# Patient Record
Sex: FEMALE | Race: WHITE | Employment: OTHER | URBAN - NONMETROPOLITAN AREA
[De-identification: names, ages, dates, MRNs, and addresses within clinical notes are randomized per-mention and may not be internally consistent; named-entity substitution may affect disease eponyms.]

---

## 2018-08-01 ENCOUNTER — OFFICE VISIT (OUTPATIENT)
Dept: PAIN MANAGEMENT | Age: 78
End: 2018-08-01
Payer: MEDICARE

## 2018-08-01 VITALS
BODY MASS INDEX: 33.6 KG/M2 | SYSTOLIC BLOOD PRESSURE: 150 MMHG | HEIGHT: 62 IN | WEIGHT: 182.6 LBS | DIASTOLIC BLOOD PRESSURE: 64 MMHG

## 2018-08-01 DIAGNOSIS — M47.817 LUMBOSACRAL SPONDYLOSIS WITHOUT MYELOPATHY: ICD-10-CM

## 2018-08-01 DIAGNOSIS — M48.061 SPINAL STENOSIS OF LUMBAR REGION WITHOUT NEUROGENIC CLAUDICATION: ICD-10-CM

## 2018-08-01 DIAGNOSIS — M54.16 LUMBAR RADICULOPATHY: Primary | ICD-10-CM

## 2018-08-01 DIAGNOSIS — M48.061 NEURAL FORAMINAL STENOSIS OF LUMBAR SPINE: ICD-10-CM

## 2018-08-01 PROCEDURE — 4040F PNEUMOC VAC/ADMIN/RCVD: CPT | Performed by: NURSE PRACTITIONER

## 2018-08-01 PROCEDURE — 99204 OFFICE O/P NEW MOD 45 MIN: CPT | Performed by: NURSE PRACTITIONER

## 2018-08-01 PROCEDURE — 4004F PT TOBACCO SCREEN RCVD TLK: CPT | Performed by: NURSE PRACTITIONER

## 2018-08-01 PROCEDURE — 1090F PRES/ABSN URINE INCON ASSESS: CPT | Performed by: NURSE PRACTITIONER

## 2018-08-01 PROCEDURE — 1123F ACP DISCUSS/DSCN MKR DOCD: CPT | Performed by: NURSE PRACTITIONER

## 2018-08-01 PROCEDURE — G8400 PT W/DXA NO RESULTS DOC: HCPCS | Performed by: NURSE PRACTITIONER

## 2018-08-01 PROCEDURE — G8427 DOCREV CUR MEDS BY ELIG CLIN: HCPCS | Performed by: NURSE PRACTITIONER

## 2018-08-01 PROCEDURE — 1101F PT FALLS ASSESS-DOCD LE1/YR: CPT | Performed by: NURSE PRACTITIONER

## 2018-08-01 PROCEDURE — G8417 CALC BMI ABV UP PARAM F/U: HCPCS | Performed by: NURSE PRACTITIONER

## 2018-08-01 RX ORDER — CHLORTHALIDONE 25 MG/1
12.5 TABLET ORAL DAILY
COMMUNITY
Start: 2018-06-06

## 2018-08-01 RX ORDER — CELECOXIB 200 MG/1
200 CAPSULE ORAL DAILY
COMMUNITY
Start: 2018-06-26

## 2018-08-01 RX ORDER — CYCLOSPORINE 0.5 MG/ML
2 EMULSION OPHTHALMIC DAILY
COMMUNITY
Start: 2018-07-03

## 2018-08-01 RX ORDER — ATORVASTATIN CALCIUM 10 MG/1
10 TABLET, FILM COATED ORAL DAILY
COMMUNITY
Start: 2018-07-05

## 2018-08-01 RX ORDER — PYRIDOXINE HCL (VITAMIN B6) 100 MG
2 TABLET ORAL DAILY
COMMUNITY

## 2018-08-01 RX ORDER — AMOXICILLIN 500 MG
1200 CAPSULE ORAL DAILY
COMMUNITY

## 2018-08-01 RX ORDER — OMEPRAZOLE 20 MG/1
20 CAPSULE, DELAYED RELEASE ORAL DAILY
COMMUNITY
Start: 2018-06-06

## 2018-08-01 ASSESSMENT — ENCOUNTER SYMPTOMS: BACK PAIN: 1

## 2018-08-01 NOTE — PATIENT INSTRUCTIONS
[place] [id number]        PROCEDURE INSTRUCTIONS FOR  PAIN MANAGEMENT PROCEDURES WITH ANESTHESIA/ IV SEDATION    Keegan Metcalf is scheduled to see Dr. Breonna Ambrosio to undergo the following procedure:   Right L3 and L4 Transforaminal Epidural Steroid Injection    Procedure Date: ____8/29/18____  Arrival Time: _____[place] will call to set time_____     Procedure Date: ____9/12/18____  Arrival Time: _____[place] will call to set time_____     Report to the [place] [id number], Registration office on the 1st floor in the hospital, after check in and signing of paper work you will then go to the second floor to the surgery center. 1. Stop the following medications prior to the procedure:   NONE    2. Take all routine medications unless otherwise instructed. Ok to take vitamins and antiinflammatory medications    3. EATING & DRINKING:  NO FOOD OR LIQUIDS FOR 8 HOURS PRIOR TO THE PROCEDURE    4. If you are allergic to contrast or iodine, you must take benadryl and prednisone prior to the injection to prevent an allergic reaction. Follow the directions on the prescription for the times to take the medication. 5.  Wear simple loose clothing, which can be easily changed. 6.  Leave jewelry (including rings) and other valuables at home. 7.  Make arrangements for a family member or friend to drive you to the surgery center. Your ride must stay in the hospital while you are having the injection done. If they cannot stay, the injection will be rescheduled. The sedation may affect your judgment following the procedure and driving a vehicle within 24 hours after the sedation could be dangerous. 8.  You will be asked to sign several forms prior to surgery; patients under the age of 25 must have a parent or legal guardian sign the permit to be able to do the procedure. 9.  You must have finished any antibiotic prescribed for recent infections.  If required, please take pre-procedure antibiotic or other pre-procedure medications as instructed. 10. Bring inhalers and pain medications with you to your procedure. 11. Bring your MRI/CT films if they were done outside of the [facility]. 12. If you should develop a cold, sore throat, cough, fever or other new indication of illness or infection, or are started on antibiotics within 2 weeks of the scheduled procedure, please notify the [facility] office as early as possible at [phone].

## 2018-08-29 ENCOUNTER — PROCEDURE VISIT (OUTPATIENT)
Dept: PAIN MANAGEMENT | Age: 78
End: 2018-08-29
Payer: MEDICARE

## 2018-08-29 DIAGNOSIS — M54.16 LUMBAR RADICULOPATHY: ICD-10-CM

## 2018-08-29 DIAGNOSIS — M47.816 LUMBAR SPONDYLOSIS: ICD-10-CM

## 2018-08-29 PROCEDURE — 64484 NJX AA&/STRD TFRM EPI L/S EA: CPT | Performed by: PHYSICAL MEDICINE & REHABILITATION

## 2018-08-29 PROCEDURE — 64483 NJX AA&/STRD TFRM EPI L/S 1: CPT | Performed by: PHYSICAL MEDICINE & REHABILITATION

## 2018-08-29 NOTE — PROGRESS NOTES
TRANSFORAMINAL EPIDURAL STEROID INJECTION    8/29/18  J  Surgeon: Pete Andersen MD    Pre-operative Diagnosis:   Patient Active Problem List   Diagnosis Code    Impingement syndrome of right shoulder region M75.41    Shoulder arthritis M19.019    Degenerative disc disease, lumbar M51.36    Lumbar radiculopathy M54.16    S/P hip replacement-right Z96.649    Lumbar spondylosis M47.816    Spinal stenosis of lumbar region without neurogenic claudication M48.061    Total knee replacement status Z96.659    Neural foraminal stenosis of lumbar spine M99.83    Lumbosacral spondylosis without myelopathy M47.817       Post-operative Diagnosis: Same    Assistants: none    This is a 68 y.o. female patient with pain in the Back, right leg.  Previous treatment and examination findings are noted in the H&P.RL3,4 transforaminal epidural injection has been requested for diagnostic and therapeutic reasons. Conservative treatment was ineffective i.e.: ice, NSAIDS, rest, narcotic medication, chiropractic care, physical therapy and message therapy. Patient is unable to perform the following ADL's: ambulating and grooming                         Last Plain films: 2017      EXAMINATION:  1. RL3,4 transforaminal radiculogram/epidurogram.   2. RL3,4 transforaminal epidural anesthetic injection. 3. RL3,4 transforaminal epidural steroid injection. CONSENT: Written consent was obtained from the patient on preprinted consent form after explaining the procedure, indications, potential complications and outcomes. Alternative treatments were also discussed. DISCUSSION: The patient was sterilely prepped and draped in the usual fashion in the prone position. Time out was verified for correct patient, side, level and procedure.     SEDATION:   No conscious sedation was performed during the procedure.  The patient remained awake and conversed throughout the procedure.  The patient underwent pulse oximetry and blood pressure monitoring independently by a trained observer, as well as by a physician. PROCEDURE:  Under image-intensifier control, a 22 gauge needle x 5 inch spinal needle was guided successfully into the epidural space employing a posterior lateral/oblique approach. Needle aspiration was negative for heme or CSF. Instillation of  .5 mL of Omnipaque 240 contrast medium opacified the spinal nerve and demonstrated contiguous flow into the  RL 4 epidural space. No vascular spread was noted. Digital subtraction was not employed to evaluate for vascular spread. The patient was monitored for any untoward reaction to contrast medium before proceeding with procedure #2. The patient did not report pain reproduction in a concordant distribution. Following needle position verification, a test dose of .5 mL of sterile saline was administered and patient monitored for any adverse effects. Then, 1 mL of Dexamethasone (Decadron 10 mg/mL)   was instilled into the epidural space and the patient's response was again monitored. Finally,  1 ml of 0.5% bupivacaine was then instilled. The patient's response was again monitored. The spinal needle was removed. Instillation of  .5 mL of Omnipaque 240 contrast medium opacified the spinal nerve and demonstrated contiguous flow into the RL 3epidural space. No vascular spread was noted. Digital subtraction was not employed to evaluate for vascular spread. The patient was monitored for any untoward reaction to contrast medium before proceeding with procedure #2. The patient did not report pain reproduction in a concordant distribution. Following needle position verification, a test dose of .5 mL of sterile saline was administered and patient monitored for any adverse effects. Then, 1 mL of Dexamethasone (Decadron 10 mg/mL)   was instilled into the epidural space and the patient's response was again monitored. Finally,  1 ml of 0.5% bupivacaine was then instilled.  The patient's

## 2018-09-12 ENCOUNTER — PROCEDURE VISIT (OUTPATIENT)
Dept: PAIN MANAGEMENT | Age: 78
End: 2018-09-12
Payer: MEDICARE

## 2018-09-12 DIAGNOSIS — M51.36 DEGENERATIVE DISC DISEASE, LUMBAR: ICD-10-CM

## 2018-09-12 DIAGNOSIS — M75.41 IMPINGEMENT SYNDROME OF RIGHT SHOULDER REGION: ICD-10-CM

## 2018-09-12 PROCEDURE — 64484 NJX AA&/STRD TFRM EPI L/S EA: CPT | Performed by: PHYSICAL MEDICINE & REHABILITATION

## 2018-09-12 PROCEDURE — 64483 NJX AA&/STRD TFRM EPI L/S 1: CPT | Performed by: PHYSICAL MEDICINE & REHABILITATION

## 2018-09-12 NOTE — PROGRESS NOTES
TRANSFORAMINAL EPIDURAL STEROID INJECTION    9/12/18  mack  Surgeon: Arturo Jones MD    Pre-operative Diagnosis:   Patient Active Problem List   Diagnosis Code    Impingement syndrome of right shoulder region M75.41    Shoulder arthritis M19.019    Degenerative disc disease, lumbar M51.36    Lumbar radiculopathy M54.16    S/P hip replacement-right Z96.649    Lumbar spondylosis M47.816    Spinal stenosis of lumbar region without neurogenic claudication M48.061    Total knee replacement status Z96.659    Neural foraminal stenosis of lumbar spine M99.83    Lumbosacral spondylosis without myelopathy M47.817       Post-operative Diagnosis: Same    Assistants: none    This is a 68 y.o. female patient with pain in the Back, left leg.  Previous treatment and examination findings are noted in the H&P. LL3,4 transforaminal epidural injection has been requested for diagnostic and therapeutic reasons. Conservative treatment was ineffective i.e.: ice, NSAIDS, rest, narcotic medication, chiropractic care, physical therapy and message therapy. Patient is unable to perform the following ADL's: ambulating, grooming and hygiene                         Last Plain films: 2017      EXAMINATION:  1. LL3,4 transforaminal radiculogram/epidurogram.   2. LL3,4 transforaminal epidural anesthetic injection. 3. LL3,4 transforaminal epidural steroid injection. CONSENT: Written consent was obtained from the patient on preprinted consent form after explaining the procedure, indications, potential complications and outcomes. Alternative treatments were also discussed. DISCUSSION: The patient was sterilely prepped and draped in the usual fashion in the prone position. Time out was verified for correct patient, side, level and procedure.     SEDATION:   No conscious sedation was performed during the procedure.  The patient remained awake and conversed throughout the procedure.  The patient underwent pulse oximetry and blood pressure monitoring independently by a trained observer, as well as by a physician. PROCEDURE:  Under image-intensifier control, a 22 gauge needle x 5 inch spinal needle was guided successfully into the epidural space employing a posterior lateral/oblique approach. Needle aspiration was negative for heme or CSF. Instillation of  .5 mL of Omnipaque 240 contrast medium opacified the spinal nerve and demonstrated contiguous flow into the  RL 4epidural space. No vascular spread was noted. Digital subtraction was not employed to evaluate for vascular spread. The patient was monitored for any untoward reaction to contrast medium before proceeding with procedure #2. The patient did not report pain reproduction in a concordant distribution. Following needle position verification, a test dose of .5 mL of sterile saline was administered and patient monitored for any adverse effects. Then, 1 mL of Dexamethasone (Decadron 10 mg/mL)   was instilled into the epidural space and the patient's response was again monitored. Finally,  1 ml of 0.5% bupivacaine was then instilled. The patient's response was again monitored. The spinal needle was removed. Instillation of  .5 mL of Omnipaque 240 contrast medium opacified the spinal nerve and demonstrated contiguous flow into the RL 3  epidural space. No vascular spread was noted. Digital subtraction was not employed to evaluate for vascular spread. The patient was monitored for any untoward reaction to contrast medium before proceeding with procedure #2. The patient did not report pain reproduction in a concordant distribution. Following needle position verification, a test dose of .5 mL of sterile saline was administered and patient monitored for any adverse effects. Then, 1 mL of Dexamethasone (Decadron 10 mg/mL)   was instilled into the epidural space and the patient's response was again monitored. Finally,  1 ml of 0.5% bupivacaine was then instilled.  The

## 2018-09-26 ENCOUNTER — OFFICE VISIT (OUTPATIENT)
Dept: PAIN MANAGEMENT | Age: 78
End: 2018-09-26
Payer: MEDICARE

## 2018-09-26 VITALS
BODY MASS INDEX: 33.49 KG/M2 | WEIGHT: 182 LBS | HEIGHT: 62 IN | SYSTOLIC BLOOD PRESSURE: 170 MMHG | RESPIRATION RATE: 16 BRPM | DIASTOLIC BLOOD PRESSURE: 60 MMHG

## 2018-09-26 DIAGNOSIS — M51.36 DEGENERATIVE DISC DISEASE, LUMBAR: Primary | ICD-10-CM

## 2018-09-26 DIAGNOSIS — M48.061 NEURAL FORAMINAL STENOSIS OF LUMBAR SPINE: ICD-10-CM

## 2018-09-26 DIAGNOSIS — M48.061 SPINAL STENOSIS OF LUMBAR REGION WITHOUT NEUROGENIC CLAUDICATION: ICD-10-CM

## 2018-09-26 DIAGNOSIS — M54.16 LUMBAR RADICULOPATHY: ICD-10-CM

## 2018-09-26 PROCEDURE — 1123F ACP DISCUSS/DSCN MKR DOCD: CPT | Performed by: PHYSICAL MEDICINE & REHABILITATION

## 2018-09-26 PROCEDURE — 1090F PRES/ABSN URINE INCON ASSESS: CPT | Performed by: PHYSICAL MEDICINE & REHABILITATION

## 2018-09-26 PROCEDURE — 4004F PT TOBACCO SCREEN RCVD TLK: CPT | Performed by: PHYSICAL MEDICINE & REHABILITATION

## 2018-09-26 PROCEDURE — G8400 PT W/DXA NO RESULTS DOC: HCPCS | Performed by: PHYSICAL MEDICINE & REHABILITATION

## 2018-09-26 PROCEDURE — 1101F PT FALLS ASSESS-DOCD LE1/YR: CPT | Performed by: PHYSICAL MEDICINE & REHABILITATION

## 2018-09-26 PROCEDURE — G8417 CALC BMI ABV UP PARAM F/U: HCPCS | Performed by: PHYSICAL MEDICINE & REHABILITATION

## 2018-09-26 PROCEDURE — 4040F PNEUMOC VAC/ADMIN/RCVD: CPT | Performed by: PHYSICAL MEDICINE & REHABILITATION

## 2018-09-26 PROCEDURE — 99214 OFFICE O/P EST MOD 30 MIN: CPT | Performed by: PHYSICAL MEDICINE & REHABILITATION

## 2018-09-26 PROCEDURE — G8428 CUR MEDS NOT DOCUMENT: HCPCS | Performed by: PHYSICAL MEDICINE & REHABILITATION

## 2018-09-26 ASSESSMENT — ENCOUNTER SYMPTOMS
ALLERGIC/IMMUNOLOGIC NEGATIVE: 1
EYES NEGATIVE: 1
RESPIRATORY NEGATIVE: 1

## 2018-09-26 NOTE — PROGRESS NOTES
PAIN MANAGEMENT FOLLOW-UP NOTE  9/26/18    CHIEF COMPLAINT: This is a 68 y.o. female patient who returns to the [facility] Management Clinic with a history of Follow Up After Procedure (didn't help much, states that it helped maybe a day or two); Back Pain; Leg Pain (right side); and Other (had therapy last October)      PAIN HPI: Garfield Peters returns today for  reevaluation. Since the visit, the patient reports that the pain is not changed. Perhaps 2 days of back and leg pain improvement with last TFEs  Also notes R lateral leg pain, L gluteal pain and  L  Toes pain  recentlyANALGESIA:   Are your Current Pain medication (s) helping to decrease pain? Yes. Current Pain score: Pain Score:   7    ADVERSE AFFECTS:   Medication Side Effects: No.    ACTIVITY:  Are you able to be more active with your pain medications? No      ABERRANT BEHAVIORS SINCE LAST VISIT? No    Review of Systems   Constitutional: Negative. HENT: Negative. Eyes: Negative. Respiratory: Negative. Endocrine: Negative. Genitourinary: Negative. Allergic/Immunologic: Negative. Neurological: Negative.          Allergies   Allergen Reactions    Bactrim [Sulfamethoxazole-Trimethoprim] Itching     Puffy eyes       Outpatient Medications Prior to Visit   Medication Sig Dispense Refill    atorvastatin (LIPITOR) 10 MG tablet Take 10 mg by mouth daily      celecoxib (CELEBREX) 200 MG capsule Take 200 mg by mouth daily      omeprazole (PRILOSEC) 20 MG delayed release capsule Take 20 mg by mouth daily      RESTASIS 0.05 % ophthalmic emulsion Place 2 drops into both eyes daily      chlorthalidone (HYGROTON) 25 MG tablet Take 12.5 mg by mouth daily      Omega-3 Fatty Acids (FISH OIL) 1200 MG CAPS Take 1,200 mg by mouth daily      Calcium Carb-Cholecalciferol (CALCIUM 600 + D) 600-200 MG-UNIT TABS Take 2 tablets by mouth daily      lisinopril (PRINIVIL;ZESTRIL) 20 MG tablet Take 20 mg by mouth daily       metoprolol (TOPROL-XL) 25 MG XL tablet Take 25 mg by mouth daily       DEXILANT 60 MG CPDR capsule Take 60 mg by mouth daily        No facility-administered medications prior to visit.         Past Medical History:   Diagnosis Date    Aftercare following joint replacement     Arthritis of knee, right     Carpal tunnel syndrome, left     Degenerative cervical disc     Degenerative disc disease, lumbar     Disorder of bursae and tendons in shoulder region     Diverticulitis of colon     Gastroesophageal reflux disease     Gastroesophageal reflux disease     Hx of bladder problems     Hypercholesteremia     Hypertension     Incmpl rotatr-cuff tear/ruptr of unsp shoulder, not trauma     Joint disease     Left hand paresthesia     Mixed hyperlipidemia     Muscle disease     Presence of artificial hip joint     Presence of right artificial hip joint     Screening mammogram, encounter for     Trigger finger, left middle finger     Trigger finger, right middle finger     Unspecified sleep apnea        Past Surgical History:   Procedure Laterality Date    BLADDER SUSPENSION  2007    CARPAL TUNNEL RELEASE  2004    CATARACT REMOVAL      JOINT REPLACEMENT  2009    left total knee  2009    JOINT REPLACEMENT  2013    right total hip replacement    KNEE ARTHROSCOPY  2005    TOTAL KNEE ARTHROPLASTY  2009     Family History   Problem Relation Age of Onset    Heart Disease Father     Hypertension Father     Cancer Father     Diabetes Father     Hypertension Mother     Cancer Mother      Social History     Social History    Marital status:      Spouse name: N/A    Number of children: N/A    Years of education: N/A     Social History Main Topics    Smoking status: Never Smoker    Smokeless tobacco: Not on file    Alcohol use Not on file    Drug use: Unknown    Sexual activity: Not on file     Other Topics Concern    Not on file     Social History Narrative    No narrative on file         Family and Social History reviewed in the electronic medical record. Imaging: Reviewed available imaging in our system with the patient. No results found. Objective:     Physical Exam:  Vitals:    09/26/18 1115   BP: (!) 200/62   Site: Left Upper Arm   Position: Sitting   Cuff Size: Large Adult   Resp: 16   Weight: 182 lb (82.6 kg)   Height: 5' 2\" (1.575 m)     Pain Score:   7    Physical Exam   Constitutional: She appears well-developed and well-nourished. Back Exam     Tenderness   The patient is experiencing tenderness in the lumbar. Range of Motion   Flexion:                  Extension:                 Lateral Bend Left:    Normal  Lateral Bend Right:  Abnormal  Rotation Right:          Rotation Left:           60  SLR    Right: Positive  Left:    Negative    Comments:  +Kemps B   +slump B                                    Assessment: This is a 68 y.o. female patient with:    Diagnosis:    Diagnosis Orders   1. Degenerative disc disease, lumbar     2. Lumbar radiculopathy     3. Spinal stenosis of lumbar region without neurogenic claudication     4. Neural foraminal stenosis of lumbar spine         Medical Comorbidities:  As listed in the patient's past medical and surgical history    Functional Limitations:   Pain limits function and quality of life. Plan:   L4 IESi x2   Consider R femoral injection    Meds:   Controlled Substances Monitoring: Pt educated about the risks of taking opiates, including increased sedation, constipation, slowed breathing, tolerance, dependence, and addiction. New Prescriptions    No medications on file      No orders of the defined types were placed in this encounter. No orders of the defined types were placed in this encounter. No Follow-up on file. The patient expressed understanding of the above assessment and plan.     Total time spent face to face with patient was 25 minutes in which  50% or more of the time was spent in counseling, education about risk and benefits

## 2018-10-24 ENCOUNTER — PROCEDURE VISIT (OUTPATIENT)
Dept: PAIN MANAGEMENT | Age: 78
End: 2018-10-24
Payer: MEDICARE

## 2018-10-24 DIAGNOSIS — M54.16 LUMBAR RADICULOPATHY: ICD-10-CM

## 2018-10-24 DIAGNOSIS — M47.816 LUMBAR SPONDYLOSIS: ICD-10-CM

## 2018-10-24 PROCEDURE — 62323 NJX INTERLAMINAR LMBR/SAC: CPT | Performed by: PHYSICAL MEDICINE & REHABILITATION

## 2018-10-24 NOTE — PROGRESS NOTES
control, a 22 gauge needle x 5 inch spinal needle was guided successfully into the epidural space employing a posterior midline/paramedian interlaminar approach. Needle aspiration was negative for heme or CSF. Instillation of  .5 mL of Omnipaque 240 contrast medium opacified the spinal nerve and demonstrated contiguous flow into the epidural space. No vascular spread was noted. Digital subtraction was not employed to evaluate for vascular spread. The patient was monitored for any untoward reaction to contrast medium before proceeding. The patient did not report pain reproduction in a concordant distribution. Following needle position verification, a test dose of  .5 mL of sterile normal saline was administered and patient monitored for any adverse effects. Then, 1 mL of Dexamethasone (Decadron 10mg/mL) was instilled into the epidural space and the patient's response was again monitored. Finally, 1ml of 0.5% bupivacaine was then instilled. The patient's response was again monitored. The spinal needle was removed, and the patient's pain response, gait pattern, sensorimotor examination and range of motion were examined. The patient tolerated the procedure well and without complications and was noted to be in stable condition prior to discharge from the procedure center with discharge instructions. IMPRESSIONS:  1. L4-5 Interlaminar epidurogram, epidural anesthesia and epidural steroid injection procedures accomplished without incident. EBL: no blood loss    SPECIMEN: none    RECOMMENDATIONS:  1. Complete and return Post-Procedure Pain and Activity Diary.    2. Contact the [address] for symptom exacerbation, fever or unusual symptoms. 3. Post-procedure care according to verbal and written discharge instructions    POST-PROCEDURE EPIDUROGRAPHY INTERPRETATION:    EXAMINATION: AP, lateral views.     FLUORO TIME: 33 seconds    DISCUSSION: Spot views of the spine reveal normal alignment and

## 2024-04-30 ENCOUNTER — OFFICE VISIT (OUTPATIENT)
Dept: PAIN MANAGEMENT | Age: 84
End: 2024-04-30
Payer: MEDICARE

## 2024-04-30 VITALS
WEIGHT: 171.6 LBS | SYSTOLIC BLOOD PRESSURE: 138 MMHG | OXYGEN SATURATION: 98 % | BODY MASS INDEX: 31.58 KG/M2 | DIASTOLIC BLOOD PRESSURE: 72 MMHG | HEART RATE: 58 BPM | RESPIRATION RATE: 18 BRPM | HEIGHT: 62 IN

## 2024-04-30 DIAGNOSIS — M48.061 NEURAL FORAMINAL STENOSIS OF LUMBAR SPINE: ICD-10-CM

## 2024-04-30 DIAGNOSIS — Z96.651 STATUS POST TOTAL RIGHT KNEE REPLACEMENT: ICD-10-CM

## 2024-04-30 DIAGNOSIS — M54.16 LUMBAR RADICULOPATHY: Primary | ICD-10-CM

## 2024-04-30 DIAGNOSIS — M47.816 LUMBAR SPONDYLOSIS: ICD-10-CM

## 2024-04-30 DIAGNOSIS — N39.41 URGE INCONTINENCE OF URINE: ICD-10-CM

## 2024-04-30 DIAGNOSIS — M48.061 SPINAL STENOSIS OF LUMBAR REGION WITHOUT NEUROGENIC CLAUDICATION: ICD-10-CM

## 2024-04-30 PROCEDURE — 0509F URINE INCON PLAN DOCD: CPT | Performed by: PHYSICAL MEDICINE & REHABILITATION

## 2024-04-30 PROCEDURE — G8417 CALC BMI ABV UP PARAM F/U: HCPCS | Performed by: PHYSICAL MEDICINE & REHABILITATION

## 2024-04-30 PROCEDURE — 99203 OFFICE O/P NEW LOW 30 MIN: CPT | Performed by: PHYSICAL MEDICINE & REHABILITATION

## 2024-04-30 PROCEDURE — 1123F ACP DISCUSS/DSCN MKR DOCD: CPT | Performed by: PHYSICAL MEDICINE & REHABILITATION

## 2024-04-30 PROCEDURE — 99204 OFFICE O/P NEW MOD 45 MIN: CPT | Performed by: PHYSICAL MEDICINE & REHABILITATION

## 2024-04-30 PROCEDURE — G8400 PT W/DXA NO RESULTS DOC: HCPCS | Performed by: PHYSICAL MEDICINE & REHABILITATION

## 2024-04-30 PROCEDURE — 1036F TOBACCO NON-USER: CPT | Performed by: PHYSICAL MEDICINE & REHABILITATION

## 2024-04-30 PROCEDURE — 1090F PRES/ABSN URINE INCON ASSESS: CPT | Performed by: PHYSICAL MEDICINE & REHABILITATION

## 2024-04-30 PROCEDURE — G8427 DOCREV CUR MEDS BY ELIG CLIN: HCPCS | Performed by: PHYSICAL MEDICINE & REHABILITATION

## 2024-04-30 RX ORDER — MELOXICAM 15 MG/1
TABLET ORAL
COMMUNITY
Start: 2023-03-07

## 2024-04-30 RX ORDER — SERTRALINE HCL 50 MG
TABLET ORAL
COMMUNITY
Start: 2024-01-16

## 2024-04-30 RX ORDER — CARVEDILOL 12.5 MG/1
TABLET ORAL
COMMUNITY
Start: 2023-03-07

## 2024-04-30 RX ORDER — ALLOPURINOL 100 MG/1
TABLET ORAL
COMMUNITY
Start: 2023-03-07

## 2024-04-30 RX ORDER — CYCLOBENZAPRINE HCL 5 MG
TABLET ORAL
COMMUNITY
Start: 2024-02-12

## 2024-04-30 ASSESSMENT — ENCOUNTER SYMPTOMS
CONSTIPATION: 1
EYES NEGATIVE: 1
RESPIRATORY NEGATIVE: 1
BACK PAIN: 1
ALLERGIC/IMMUNOLOGIC NEGATIVE: 1
DIARRHEA: 1

## 2024-04-30 NOTE — PROGRESS NOTES
CONTRAST           I have reviewed the chief complaint and the history of present illness, vitals and all subjective documentation by [facility] clinical staff.    The patient's history and physical examination are consistent with lumbar radiculitis .     The patient has failed to see improvement with conservative measures.    The patient's pain is moderate to severe and causes functional deficit measured on pain and functional disability scales and reporting worsening quality of life.    PLAN:  Treatment options have been discussed with patient and all questions have been answered to patients satisfaction. The risks, benefits, and any alternatives of treatment have been discussed.    Medications:  Non-opioid therapy, the following medications are prescribed:-    Opioid therapy, the following medication are prescribed: -  -Pain Treatment agreement to be reviewed and signed by patient -  -Urine Drug Screen to be completed -  Education provided to patient regarding short term and long term implications of opioid medication use and safe storage.    Interventions:  Schedule - with flouro guidance and [] with [] without sedation. No      Imaging:  Schedule -    Referrals:  Place referral to -    Follow-up Plan:  Schedule patient to follow up with our office in -       Immanuel Johnson MD  Interventional Pain Management/PM&R   [facility]

## 2024-05-03 ENCOUNTER — TELEPHONE (OUTPATIENT)
Dept: PAIN MANAGEMENT | Age: 84
End: 2024-05-03

## 2024-05-03 NOTE — TELEPHONE ENCOUNTER
Patient is calling wanting pain medication.    Last Appt:  4/30/2024  Next Appt:   Visit date not found  Med verified in Epic      Patient has MRI scheduled in 2 weeks.    [facility]

## 2024-05-21 DIAGNOSIS — M47.816 LUMBAR SPONDYLOSIS: ICD-10-CM

## 2024-05-21 DIAGNOSIS — M48.061 SPINAL STENOSIS OF LUMBAR REGION WITHOUT NEUROGENIC CLAUDICATION: ICD-10-CM

## 2024-05-21 DIAGNOSIS — M48.061 NEURAL FORAMINAL STENOSIS OF LUMBAR SPINE: ICD-10-CM

## 2024-05-21 DIAGNOSIS — M54.16 LUMBAR RADICULOPATHY: ICD-10-CM

## 2024-05-21 DIAGNOSIS — N39.41 URGE INCONTINENCE OF URINE: ICD-10-CM

## 2024-05-21 DIAGNOSIS — Z96.651 STATUS POST TOTAL RIGHT KNEE REPLACEMENT: ICD-10-CM

## 2024-06-05 ENCOUNTER — OFFICE VISIT (OUTPATIENT)
Dept: PAIN MANAGEMENT | Age: 84
End: 2024-06-05
Payer: MEDICARE

## 2024-06-05 VITALS
RESPIRATION RATE: 17 BRPM | HEART RATE: 57 BPM | WEIGHT: 172 LBS | DIASTOLIC BLOOD PRESSURE: 70 MMHG | OXYGEN SATURATION: 93 % | HEIGHT: 62 IN | BODY MASS INDEX: 31.65 KG/M2 | SYSTOLIC BLOOD PRESSURE: 158 MMHG

## 2024-06-05 DIAGNOSIS — M48.061 SPINAL STENOSIS OF LUMBAR REGION WITHOUT NEUROGENIC CLAUDICATION: ICD-10-CM

## 2024-06-05 DIAGNOSIS — M47.816 LUMBAR SPONDYLOSIS: ICD-10-CM

## 2024-06-05 DIAGNOSIS — M51.36 DEGENERATIVE DISC DISEASE, LUMBAR: Primary | ICD-10-CM

## 2024-06-05 PROCEDURE — G8427 DOCREV CUR MEDS BY ELIG CLIN: HCPCS | Performed by: PHYSICAL MEDICINE & REHABILITATION

## 2024-06-05 PROCEDURE — G8400 PT W/DXA NO RESULTS DOC: HCPCS | Performed by: PHYSICAL MEDICINE & REHABILITATION

## 2024-06-05 PROCEDURE — 99214 OFFICE O/P EST MOD 30 MIN: CPT | Performed by: PHYSICAL MEDICINE & REHABILITATION

## 2024-06-05 PROCEDURE — 1036F TOBACCO NON-USER: CPT | Performed by: PHYSICAL MEDICINE & REHABILITATION

## 2024-06-05 PROCEDURE — G8417 CALC BMI ABV UP PARAM F/U: HCPCS | Performed by: PHYSICAL MEDICINE & REHABILITATION

## 2024-06-05 PROCEDURE — 1090F PRES/ABSN URINE INCON ASSESS: CPT | Performed by: PHYSICAL MEDICINE & REHABILITATION

## 2024-06-05 PROCEDURE — 1123F ACP DISCUSS/DSCN MKR DOCD: CPT | Performed by: PHYSICAL MEDICINE & REHABILITATION

## 2024-06-05 ASSESSMENT — ENCOUNTER SYMPTOMS
ALLERGIC/IMMUNOLOGIC NEGATIVE: 1
CONSTIPATION: 1
VOMITING: 0
SHORTNESS OF BREATH: 0
EYES NEGATIVE: 1
BACK PAIN: 1
NAUSEA: 1
ABDOMINAL PAIN: 1
DIARRHEA: 0

## 2024-06-05 NOTE — PATIENT INSTRUCTIONS
[place] [id number]    PROCEDURE INSTRUCTIONS FOR PAIN MANAGEMENT PROCEDURES    You are scheduled to see Dr. Johnson to undergo the following procedure:  Diagnostic Medial Branch Block Right Lumbar 3-4, 4-5, L5-S1    Procedure Date:  Wed 6/26/24    Arrival Time: You will receive a call from [facility] to inform you of your arrival time at least a couple days prior to your procedure.    Enter the facility through the ER doors and take the elevator to the 2nd floor.  Check in at Same Day Surgery.     1. Stop the following medications prior to the procedure:  N/A     2.  Take all routine medications unless otherwise instructed. Ok to take vitamins and antiinflammatory medications    3.  EATING & DRINKING:  NO FOOD OR LIQUIDS FOR 4 HOURS PRIOR TO THE PROCEDURE    4. If you are allergic to contrast or iodine, you must take benadryl and prednisone prior to the injection to prevent an allergic reaction.  Follow the directions on the prescription for the times to take the medication.    5.  Wear simple loose clothing, which can be easily changed.      6.  Leave jewelry (including rings) and other valuables at home.     7.  Make arrangements for a family member or friend to drive you to the surgery center.  Your ride must stay in the hospital while you are having the injection done.  The sedation may affect your judgment following the procedure and driving a vehicle within 24 hours after the sedation could be dangerous.     8.  You will be asked to sign several forms prior to surgery; patients under the age of 18 must have a parent or legal guardian sign the permit to be able to do the procedure.      9. Bring inhalers and pain medications with you to your procedure.      10. If you should develop a cold, sore throat, cough, fever or other new indication of illness or infection, or are started on antibiotics within 2 weeks of the scheduled procedure,

## 2024-06-05 NOTE — PROGRESS NOTES
Frequency of Pain Intermittent   Aggravating Factors Standing;Walking;Bending;Straightening;Stretching   Limiting Behavior Yes   Relieving Factors Nsaids;Rest;Ice   Result of Injury No   Work-Related Injury No   Are there other pain locations you wish to document? No        Current Medications & Allergies:   Current Outpatient Medications   Medication Instructions   • allopurinol (ZYLOPRIM) 100 MG tablet    • atorvastatin (LIPITOR) 10 mg, Oral, DAILY   • Calcium Carb-Cholecalciferol (CALCIUM 600 + D) 600-200 MG-UNIT TABS 2 tablets, Oral, DAILY   • carvedilol (COREG) 12.5 MG tablet    • chlorthalidone (HYGROTON) 12.5 mg, Oral, DAILY   • cyclobenzaprine (FLEXERIL) 5 MG tablet    • Fish Oil 1,200 mg, Oral, DAILY   • GLUCOS-CHONDROIT-CA-MG-C-D PO Oral   • lisinopril (PRINIVIL;ZESTRIL) 20 mg, Oral, 2 TIMES DAILY   • meloxicam (MOBIC) 15 MG tablet    • omeprazole (PRILOSEC) 20 mg, Oral, DAILY   • RESTASIS 0.05 % ophthalmic emulsion 2 drops, Both Eyes, DAILY   • ZOLOFT 50 MG tablet        Allergies   Allergen Reactions   • Bactrim [Sulfamethoxazole-Trimethoprim] Itching     Puffy eyes       Review of Systems:   Review of Systems   Constitutional:  Negative for activity change, appetite change and fatigue.   Eyes: Negative.    Respiratory:  Negative for shortness of breath.    Cardiovascular:  Negative for chest pain.   Gastrointestinal:  Positive for abdominal pain, constipation and nausea. Negative for diarrhea and vomiting.   Endocrine: Negative.    Genitourinary:  Positive for frequency. Negative for difficulty urinating.   Musculoskeletal:  Positive for back pain, gait problem and myalgias. Negative for neck pain and neck stiffness.   Skin: Negative.    Allergic/Immunologic: Negative.    Neurological:  Positive for weakness and headaches. Negative for dizziness and light-headedness.   Hematological: Negative.    Psychiatric/Behavioral:  Positive for agitation, confusion and decreased concentration. Negative for sleep

## 2024-06-25 ENCOUNTER — TELEPHONE (OUTPATIENT)
Dept: PAIN MANAGEMENT | Age: 84
End: 2024-06-25

## 2024-06-26 ENCOUNTER — PROCEDURE VISIT (OUTPATIENT)
Dept: PAIN MANAGEMENT | Age: 84
End: 2024-06-26
Payer: MEDICARE

## 2024-06-26 DIAGNOSIS — M47.817 LUMBOSACRAL SPONDYLOSIS WITHOUT MYELOPATHY: ICD-10-CM

## 2024-06-26 DIAGNOSIS — M48.061 NEURAL FORAMINAL STENOSIS OF LUMBAR SPINE: ICD-10-CM

## 2024-06-26 DIAGNOSIS — M47.816 LUMBAR SPONDYLOSIS: Primary | ICD-10-CM

## 2024-06-26 DIAGNOSIS — M51.36 DEGENERATIVE DISC DISEASE, LUMBAR: ICD-10-CM

## 2024-06-26 PROCEDURE — 64493 INJ PARAVERT F JNT L/S 1 LEV: CPT | Performed by: PHYSICAL MEDICINE & REHABILITATION

## 2024-06-26 PROCEDURE — 64494 INJ PARAVERT F JNT L/S 2 LEV: CPT | Performed by: PHYSICAL MEDICINE & REHABILITATION

## 2024-06-27 NOTE — TELEPHONE ENCOUNTER
Attempted to call pt.  She was not at home.  I was told she should be home in 20 minutes.  I said I would try again tomorrow if not this afternoon.

## 2024-06-28 NOTE — PROGRESS NOTES
MEDIAL BRANCH BLOCK   Diagnostic  lumbar    6/26/24    Surgeon: Immanuel Johnson MD    Pre-operative Diagnosis: lumbar facet syndrome ,  lumbar spondylosis    Post-operative Diagnosis: Same    INDICATION:Please see H&P for details on previous treatments, examination findings, and work up. bilateral B L4-55-S1 medial branch blocks are requested for diagnostic reasons.    Conservative treatment was ineffective i.e.: ice, NSAIDS, rest, narcotic medication, chiropractic care, physical therapy and message therapy.    Patient is unable to perform the following ADL's: ambulating and grooming                         Last Plain films: 2022    EXAMINATION:  bilateral B L4-5-5S1 medial branch blocks.    CONSENT:  Written consent was obtained from the patient on preprinted consent form after explaining the procedure, indications, potential complications and outcomes. Alternative treatments were also discussed.    DISCUSSION:  The patient was sterilely prepped and draped in the usual fashion in the prone position.  “Time out” was verified for correct patient, side, level and procedure.    SEDATION:   No conscious sedation was performed during the procedure.  The patient remained awake and conversed throughout the procedure.  The patient underwent pulse oximetry and blood pressure monitoring independently by a trained observer, as well as by a physician.    PROCEDURE:   Under image-intensifier control, 22 gauge needle x 5 inch spinal needles were directed into the bilateral B L4-55-S1 medial branches of the dorsal rami at the target points, according to SIS guidelines.  Needle tip positions were confirmed with 0.2 mL of Omnipaque 240 contrast medium. Then, 1mL of equal volumes of 0.75% bupivacaine // 2% lidocaine / and Celestone> were instilled at each site.     EBL: no blood loss    SPECIMEN: none    The patient tolerated the procedure well and without complications and was noted to be in stable condition prior to discharge from

## 2024-06-28 NOTE — TELEPHONE ENCOUNTER
I spoke to pt.  She said her pain was higher when she walked from the parking lot to  the surgery center at  for her injection.  She said her pain was an 8 and then went down to a 2 immediately afterward.  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    We discussed.  She said her leg had some weakness immediately after injection, saying she felt \"a nerve down her leg\" during the injection and that her BP went up.  By the time her BP was OK to leave the facility, her leg was stronger.    I scheduled her a follow up visit 7/10 in the office to discuss with Dr. Johnson what the next pain management step should be.  She states understanding and agreement.

## 2024-07-10 ENCOUNTER — OFFICE VISIT (OUTPATIENT)
Dept: PAIN MANAGEMENT | Age: 84
End: 2024-07-10
Payer: MEDICARE

## 2024-07-10 VITALS
OXYGEN SATURATION: 95 % | DIASTOLIC BLOOD PRESSURE: 78 MMHG | SYSTOLIC BLOOD PRESSURE: 194 MMHG | HEART RATE: 58 BPM | HEIGHT: 62 IN | WEIGHT: 171 LBS | BODY MASS INDEX: 31.47 KG/M2 | RESPIRATION RATE: 16 BRPM

## 2024-07-10 DIAGNOSIS — M47.816 LUMBAR SPONDYLOSIS: Primary | ICD-10-CM

## 2024-07-10 DIAGNOSIS — M25.551 RIGHT HIP PAIN: ICD-10-CM

## 2024-07-10 PROCEDURE — 99214 OFFICE O/P EST MOD 30 MIN: CPT | Performed by: PHYSICAL MEDICINE & REHABILITATION

## 2024-07-10 PROCEDURE — 1123F ACP DISCUSS/DSCN MKR DOCD: CPT | Performed by: PHYSICAL MEDICINE & REHABILITATION

## 2024-07-10 PROCEDURE — 1090F PRES/ABSN URINE INCON ASSESS: CPT | Performed by: PHYSICAL MEDICINE & REHABILITATION

## 2024-07-10 PROCEDURE — 1036F TOBACCO NON-USER: CPT | Performed by: PHYSICAL MEDICINE & REHABILITATION

## 2024-07-10 PROCEDURE — G8427 DOCREV CUR MEDS BY ELIG CLIN: HCPCS | Performed by: PHYSICAL MEDICINE & REHABILITATION

## 2024-07-10 PROCEDURE — G8417 CALC BMI ABV UP PARAM F/U: HCPCS | Performed by: PHYSICAL MEDICINE & REHABILITATION

## 2024-07-10 PROCEDURE — G8400 PT W/DXA NO RESULTS DOC: HCPCS | Performed by: PHYSICAL MEDICINE & REHABILITATION

## 2024-07-10 PROCEDURE — 99213 OFFICE O/P EST LOW 20 MIN: CPT | Performed by: PHYSICAL MEDICINE & REHABILITATION

## 2024-07-10 ASSESSMENT — ENCOUNTER SYMPTOMS
ALLERGIC/IMMUNOLOGIC NEGATIVE: 1
EYES NEGATIVE: 1
DIARRHEA: 0
CONSTIPATION: 0
RESPIRATORY NEGATIVE: 1
BACK PAIN: 1

## 2024-07-10 NOTE — PROGRESS NOTES
Factors Rest   Result of Injury No   Work-Related Injury No   Are there other pain locations you wish to document? No        Current Medications & Allergies:   Current Outpatient Medications   Medication Instructions    allopurinol (ZYLOPRIM) 100 MG tablet     atorvastatin (LIPITOR) 10 mg, Oral, DAILY    Calcium Carb-Cholecalciferol (CALCIUM 600 + D) 600-200 MG-UNIT TABS 2 tablets, Oral, DAILY    carvedilol (COREG) 12.5 MG tablet     Fish Oil 1,200 mg, Oral, DAILY    lisinopril (PRINIVIL;ZESTRIL) 20 mg, Oral, 2 TIMES DAILY    meloxicam (MOBIC) 15 MG tablet     omeprazole (PRILOSEC) 20 mg, Oral, DAILY    polyethylene glycol (GOLYTELY) 236 g solution     RESTASIS 0.05 % ophthalmic emulsion 2 drops, Both Eyes, DAILY    ZOLOFT 50 MG tablet        Allergies   Allergen Reactions    Bactrim [Sulfamethoxazole-Trimethoprim] Itching     Puffy eyes       Review of Systems:   Review of Systems   Constitutional:  Positive for fatigue.   HENT: Negative.     Eyes: Negative.    Respiratory: Negative.     Cardiovascular: Negative.    Gastrointestinal:  Negative for constipation and diarrhea.   Endocrine: Negative.    Genitourinary:  Negative for difficulty urinating and flank pain.   Musculoskeletal:  Positive for back pain, gait problem and myalgias.   Skin: Negative.    Allergic/Immunologic: Negative.    Neurological:  Positive for weakness and numbness.   Hematological: Negative.    Psychiatric/Behavioral:  Positive for agitation, decreased concentration and sleep disturbance. The patient is nervous/anxious.         OBJECTIVE:  Vitals:    07/10/24 1012   BP: (!) 194/78   Pulse: 58   Resp: 16   SpO2: 95%       PHYSICAL EXAM  Physical Exam  Constitutional:       General: She is not in acute distress.     Appearance: Normal appearance. She is well-developed. She is not diaphoretic.   HENT:      Head: Normocephalic and atraumatic.      Right Ear: External ear normal. No decreased hearing noted.      Left Ear: External ear normal. No

## 2024-09-11 ENCOUNTER — OFFICE VISIT (OUTPATIENT)
Dept: PAIN MANAGEMENT | Age: 84
End: 2024-09-11
Payer: MEDICARE

## 2024-09-11 VITALS
WEIGHT: 172 LBS | DIASTOLIC BLOOD PRESSURE: 74 MMHG | HEART RATE: 60 BPM | SYSTOLIC BLOOD PRESSURE: 148 MMHG | HEIGHT: 62 IN | RESPIRATION RATE: 16 BRPM | BODY MASS INDEX: 31.65 KG/M2 | OXYGEN SATURATION: 95 %

## 2024-09-11 DIAGNOSIS — M47.816 FACET HYPERTROPHY OF LUMBAR REGION: ICD-10-CM

## 2024-09-11 DIAGNOSIS — M47.817 LUMBOSACRAL SPONDYLOSIS WITHOUT MYELOPATHY: Primary | ICD-10-CM

## 2024-09-11 DIAGNOSIS — M25.561 RECURRENT PAIN OF RIGHT KNEE: ICD-10-CM

## 2024-09-11 PROCEDURE — 1036F TOBACCO NON-USER: CPT | Performed by: PHYSICAL MEDICINE & REHABILITATION

## 2024-09-11 PROCEDURE — G8400 PT W/DXA NO RESULTS DOC: HCPCS | Performed by: PHYSICAL MEDICINE & REHABILITATION

## 2024-09-11 PROCEDURE — 99214 OFFICE O/P EST MOD 30 MIN: CPT | Performed by: PHYSICAL MEDICINE & REHABILITATION

## 2024-09-11 PROCEDURE — G8427 DOCREV CUR MEDS BY ELIG CLIN: HCPCS | Performed by: PHYSICAL MEDICINE & REHABILITATION

## 2024-09-11 PROCEDURE — 1123F ACP DISCUSS/DSCN MKR DOCD: CPT | Performed by: PHYSICAL MEDICINE & REHABILITATION

## 2024-09-11 PROCEDURE — G8417 CALC BMI ABV UP PARAM F/U: HCPCS | Performed by: PHYSICAL MEDICINE & REHABILITATION

## 2024-09-11 PROCEDURE — 1090F PRES/ABSN URINE INCON ASSESS: CPT | Performed by: PHYSICAL MEDICINE & REHABILITATION

## 2024-09-11 ASSESSMENT — ENCOUNTER SYMPTOMS
APNEA: 0
BACK PAIN: 1
COLOR CHANGE: 0

## 2024-12-04 ENCOUNTER — OFFICE VISIT (OUTPATIENT)
Dept: PAIN MANAGEMENT | Age: 84
End: 2024-12-04
Payer: MEDICARE

## 2024-12-04 VITALS
SYSTOLIC BLOOD PRESSURE: 144 MMHG | DIASTOLIC BLOOD PRESSURE: 84 MMHG | RESPIRATION RATE: 17 BRPM | HEART RATE: 78 BPM | BODY MASS INDEX: 31.65 KG/M2 | WEIGHT: 172 LBS | HEIGHT: 62 IN | OXYGEN SATURATION: 94 %

## 2024-12-04 DIAGNOSIS — M47.817 LUMBOSACRAL SPONDYLOSIS WITHOUT MYELOPATHY: Primary | ICD-10-CM

## 2024-12-04 DIAGNOSIS — M47.816 FACET HYPERTROPHY OF LUMBAR REGION: ICD-10-CM

## 2024-12-04 DIAGNOSIS — M54.12 CERVICAL RADICULITIS: ICD-10-CM

## 2024-12-04 PROCEDURE — G8484 FLU IMMUNIZE NO ADMIN: HCPCS | Performed by: PHYSICAL MEDICINE & REHABILITATION

## 2024-12-04 PROCEDURE — 1159F MED LIST DOCD IN RCRD: CPT | Performed by: PHYSICAL MEDICINE & REHABILITATION

## 2024-12-04 PROCEDURE — G8427 DOCREV CUR MEDS BY ELIG CLIN: HCPCS | Performed by: PHYSICAL MEDICINE & REHABILITATION

## 2024-12-04 PROCEDURE — 99214 OFFICE O/P EST MOD 30 MIN: CPT | Performed by: PHYSICAL MEDICINE & REHABILITATION

## 2024-12-04 PROCEDURE — 1036F TOBACCO NON-USER: CPT | Performed by: PHYSICAL MEDICINE & REHABILITATION

## 2024-12-04 PROCEDURE — 1090F PRES/ABSN URINE INCON ASSESS: CPT | Performed by: PHYSICAL MEDICINE & REHABILITATION

## 2024-12-04 PROCEDURE — 1123F ACP DISCUSS/DSCN MKR DOCD: CPT | Performed by: PHYSICAL MEDICINE & REHABILITATION

## 2024-12-04 PROCEDURE — 1160F RVW MEDS BY RX/DR IN RCRD: CPT | Performed by: PHYSICAL MEDICINE & REHABILITATION

## 2024-12-04 PROCEDURE — G8400 PT W/DXA NO RESULTS DOC: HCPCS | Performed by: PHYSICAL MEDICINE & REHABILITATION

## 2024-12-04 PROCEDURE — G8417 CALC BMI ABV UP PARAM F/U: HCPCS | Performed by: PHYSICAL MEDICINE & REHABILITATION

## 2024-12-04 RX ORDER — METHYLPREDNISOLONE 4 MG/1
TABLET ORAL
Qty: 1 KIT | Refills: 1 | Status: SHIPPED | OUTPATIENT
Start: 2024-12-04 | End: 2024-12-10

## 2024-12-04 ASSESSMENT — ENCOUNTER SYMPTOMS
BACK PAIN: 1
RESPIRATORY NEGATIVE: 1
CONSTIPATION: 0
ALLERGIC/IMMUNOLOGIC NEGATIVE: 1
DIARRHEA: 0
EYES NEGATIVE: 1

## 2024-12-04 NOTE — PROGRESS NOTES
[facility]  [address] [place]. [address]    Patient Name: Mikaela Mejía  MRN: [id number]  Encounter Date: 12/4/2024     SUBJECTIVE:  Mikaela Mejía is a 84 y.o., female being seen today regarding   Chief Complaint   Patient presents with    Back Pain     lumbar   B lumbar pain    New L neck pain down L arm    Functionality Assessment & Goals:  On a scale of 0 (Does not Interfere) to 10 (Completely Interferes)  Which number describes how during the past week pain has interfered with the following:   A.  General Activity: 5    B.  Mood:  0   C.  Walking Ability:  4   D.  Normal Work (Includes both work outside the home and housework):  5   E.  Relations with Other People:  0   F.  Sleep:  2    G.  Enjoyment of Life:  0  2.  Patient prefers to Take their Pain Medications:   [] On a regular basis   [] Only when necessary   [x] Does not take pain medications  3.  What are the Patient's Goals/ Expectations for Visiting Pain Management?   [] Learn about my pain   [x] Physical Therapy   [x] Receive Injections   [] Deal with Anxiety and Stress   [x] Receive Medication   [] Treat Depression    [] Treat Sleep   [] Treat Opioid Dependence/ Addiction    Conservative Therapies:  Patient has tried the following conservative therapies:  [] NSAIDS  [x] Analgesics  [x] Home Exercises  [x] Physical Therapy  [] Cognitive Therapy  [] Other:     Oswestry Disability Questionnaire completed by patient on 5/1/24     Current Pain Assessment:         12/4/2024    11:07 AM   AMB PAIN ASSESSMENT   Location of Pain Back   Location Modifiers Posterior;Medial;Lateral;Inferior;Left   Severity of Pain 3   Quality of Pain Dull;Aching   Duration of Pain Persistent   Frequency of Pain Constant   Aggravating Factors Bending;Stretching;Straightening;Squatting;Standing;Stairs;Exercise;Kneeling;Walking   Limiting Behavior Yes   Relieving Factors Rest   Result of Injury No   Work-Related Injury No   Are there other

## 2025-01-15 ENCOUNTER — OFFICE VISIT (OUTPATIENT)
Dept: PAIN MANAGEMENT | Age: 85
End: 2025-01-15
Payer: MEDICARE

## 2025-01-15 VITALS
OXYGEN SATURATION: 97 % | HEART RATE: 61 BPM | TEMPERATURE: 97.9 F | SYSTOLIC BLOOD PRESSURE: 118 MMHG | DIASTOLIC BLOOD PRESSURE: 76 MMHG

## 2025-01-15 DIAGNOSIS — M54.12 CERVICAL RADICULITIS: Primary | ICD-10-CM

## 2025-01-15 DIAGNOSIS — M47.812 CERVICAL SPONDYLOSIS: ICD-10-CM

## 2025-01-15 PROCEDURE — G8427 DOCREV CUR MEDS BY ELIG CLIN: HCPCS | Performed by: PHYSICAL MEDICINE & REHABILITATION

## 2025-01-15 PROCEDURE — G8417 CALC BMI ABV UP PARAM F/U: HCPCS | Performed by: PHYSICAL MEDICINE & REHABILITATION

## 2025-01-15 PROCEDURE — G8400 PT W/DXA NO RESULTS DOC: HCPCS | Performed by: PHYSICAL MEDICINE & REHABILITATION

## 2025-01-15 PROCEDURE — 1123F ACP DISCUSS/DSCN MKR DOCD: CPT | Performed by: PHYSICAL MEDICINE & REHABILITATION

## 2025-01-15 PROCEDURE — 1159F MED LIST DOCD IN RCRD: CPT | Performed by: PHYSICAL MEDICINE & REHABILITATION

## 2025-01-15 PROCEDURE — 1090F PRES/ABSN URINE INCON ASSESS: CPT | Performed by: PHYSICAL MEDICINE & REHABILITATION

## 2025-01-15 PROCEDURE — 99214 OFFICE O/P EST MOD 30 MIN: CPT | Performed by: PHYSICAL MEDICINE & REHABILITATION

## 2025-01-15 PROCEDURE — 1036F TOBACCO NON-USER: CPT | Performed by: PHYSICAL MEDICINE & REHABILITATION

## 2025-01-15 ASSESSMENT — ENCOUNTER SYMPTOMS
CONSTIPATION: 0
BACK PAIN: 1
RESPIRATORY NEGATIVE: 1
ALLERGIC/IMMUNOLOGIC NEGATIVE: 1
DIARRHEA: 0
EYES NEGATIVE: 1

## 2025-01-15 NOTE — PROGRESS NOTES
[facility]  [address] [place]. [address]    Patient Name: Mikaela Mejía  MRN: [id number]  Encounter Date: 1/15/2025     SUBJECTIVE:  Mikaela Mejía is a 84 y.o., female being seen today regarding   Chief Complaint   Patient presents with    Pain     Left arm, lower back     1 Month Follow-Up   B lumbar pain    New L neck pain down L arm  Pain in L upper arm  Worst pain  numbness down L arm when lying  supine    Functionality Assessment & Goals:  On a scale of 0 (Does not Interfere) to 10 (Completely Interferes)  Which number describes how during the past week pain has interfered with the following:   A.  General Activity: 4   B.  Mood:  3   C.  Walking Ability:  8   D.  Normal Work (Includes both work outside the home and housework):  8   E.  Relations with Other People:  2   F.  Sleep:  5   G.  Enjoyment of Life:  4  2.  Patient prefers to Take their Pain Medications:   [] On a regular basis   [x] Only when necessary - tylenol only   [] Does not take pain medications  3.  What are the Patient's Goals/ Expectations for Visiting Pain Management?   [] Learn about my pain   [x] Physical Therapy   [x] Receive Injections   [] Deal with Anxiety and Stress   [x] Receive Medication   [] Treat Depression    [] Treat Sleep   [] Treat Opioid Dependence/ Addiction    Conservative Therapies:  Patient has tried the following conservative therapies:  [] NSAIDS - cannot tolerate due to current medications   [x] Analgesics  [x] Home Exercises- prescribed by PT / doing daily for 20 mins  [x] Physical Therapy- [facility] - helped with sciatic only but ineffective for lower back pain.   [] Cognitive Therapy  [] Other:     Oswestry Disability Questionnaire completed by patient on 5/1/24     Current Pain Assessment:         1/15/2025    10:42 AM   AMB PAIN ASSESSMENT   Location of Pain Other (Comment)   Severity of Pain 3   Quality of Pain Aching   Duration of Pain Persistent   Frequency of Pain

## 2025-02-12 ENCOUNTER — OFFICE VISIT (OUTPATIENT)
Dept: PAIN MANAGEMENT | Age: 85
End: 2025-02-12
Payer: MEDICARE

## 2025-02-12 VITALS
OXYGEN SATURATION: 97 % | DIASTOLIC BLOOD PRESSURE: 84 MMHG | HEIGHT: 62 IN | RESPIRATION RATE: 16 BRPM | WEIGHT: 172 LBS | BODY MASS INDEX: 31.65 KG/M2 | HEART RATE: 68 BPM | SYSTOLIC BLOOD PRESSURE: 134 MMHG

## 2025-02-12 DIAGNOSIS — M25.512 ACUTE PAIN OF LEFT SHOULDER: Primary | ICD-10-CM

## 2025-02-12 DIAGNOSIS — M54.12 CERVICAL RADICULITIS: ICD-10-CM

## 2025-02-12 PROCEDURE — G8427 DOCREV CUR MEDS BY ELIG CLIN: HCPCS | Performed by: PHYSICAL MEDICINE & REHABILITATION

## 2025-02-12 PROCEDURE — 1090F PRES/ABSN URINE INCON ASSESS: CPT | Performed by: PHYSICAL MEDICINE & REHABILITATION

## 2025-02-12 PROCEDURE — 1159F MED LIST DOCD IN RCRD: CPT | Performed by: PHYSICAL MEDICINE & REHABILITATION

## 2025-02-12 PROCEDURE — 1123F ACP DISCUSS/DSCN MKR DOCD: CPT | Performed by: PHYSICAL MEDICINE & REHABILITATION

## 2025-02-12 PROCEDURE — 1036F TOBACCO NON-USER: CPT | Performed by: PHYSICAL MEDICINE & REHABILITATION

## 2025-02-12 PROCEDURE — G8400 PT W/DXA NO RESULTS DOC: HCPCS | Performed by: PHYSICAL MEDICINE & REHABILITATION

## 2025-02-12 PROCEDURE — 1160F RVW MEDS BY RX/DR IN RCRD: CPT | Performed by: PHYSICAL MEDICINE & REHABILITATION

## 2025-02-12 PROCEDURE — G8417 CALC BMI ABV UP PARAM F/U: HCPCS | Performed by: PHYSICAL MEDICINE & REHABILITATION

## 2025-02-12 PROCEDURE — 99214 OFFICE O/P EST MOD 30 MIN: CPT | Performed by: PHYSICAL MEDICINE & REHABILITATION

## 2025-02-12 ASSESSMENT — ENCOUNTER SYMPTOMS: BACK PAIN: 1

## 2025-02-12 NOTE — PROGRESS NOTES
[facility]  [address] [place]. [address]    Patient Name: Mikaela Mejía  MRN: [id number]  Encounter Date: 2/12/2025     SUBJECTIVE:  Mikaela Mejía is a 84 y.o., female being seen today regarding   Chief Complaint   Patient presents with    Back Pain     lumbar    Shoulder Pain     left    Neck Pain   .L shoulder  pain   hard to  raise arm  happened  about time of  L neck   pain in  fall currently is worse    Functionality Assessment & Goals:  On a scale of 0 (Does not Interfere) to 10 (Completely Interferes)  Which number describes how during the past week pain has interfered with the following:   A.  General Activity: 5    B.  Mood:  5   C.  Walking Ability:  8   D.  Normal Work (Includes both work outside the home and housework):  8   E.  Relations with Other People:  2   F.  Sleep:  5    G.  Enjoyment of Life:  5  2.  Patient prefers to Take their Pain Medications:   [] On a regular basis   [] Only when necessary   [x] Does not take pain medications  3.  What are the Patient's Goals/ Expectations for Visiting Pain Management?   [] Learn about my pain   [x] Physical Therapy   [x] Receive Injections   [] Deal with Anxiety and Stress   [x] Receive Medication   [] Treat Depression    [] Treat Sleep   [] Treat Opioid Dependence/ Addiction    Conservative Therapies:  Patient has tried the following conservative therapies:  [] NSAIDS  [x] Analgesics  [] Home Exercises  [x] Physical Therapy  [] Cognitive Therapy  [] Other:     Previous Imaging related to chief complaint:  1/18/25 ct cervical  1/17/25 mri lumbar    Oswestry Disability Questionnaire completed by patient on 12/9/24     Current Pain Assessment:         2/12/2025    11:13 AM   AMB PAIN ASSESSMENT   Location of Pain Back   Location Modifiers Left;Posterior;Anterior;Medial;Lateral   Severity of Pain 8   Quality of Pain Aching;Throbbing;Sharp   Duration of Pain Persistent   Frequency of Pain Constant   Aggravating